# Patient Record
Sex: MALE | Race: WHITE | NOT HISPANIC OR LATINO | Employment: OTHER | URBAN - METROPOLITAN AREA
[De-identification: names, ages, dates, MRNs, and addresses within clinical notes are randomized per-mention and may not be internally consistent; named-entity substitution may affect disease eponyms.]

---

## 2024-07-10 ENCOUNTER — TELEPHONE (OUTPATIENT)
Dept: UROLOGY | Facility: CLINIC | Age: 79
End: 2024-07-10
Payer: OTHER GOVERNMENT

## 2024-07-10 ENCOUNTER — TELEPHONE (OUTPATIENT)
Dept: UROLOGY | Facility: CLINIC | Age: 79
End: 2024-07-10

## 2024-07-12 ENCOUNTER — OFFICE VISIT (OUTPATIENT)
Dept: UROLOGY | Facility: CLINIC | Age: 79
End: 2024-07-12
Payer: OTHER GOVERNMENT

## 2024-07-12 VITALS — WEIGHT: 208.44 LBS | BODY MASS INDEX: 30.87 KG/M2 | HEIGHT: 69 IN

## 2024-07-12 DIAGNOSIS — N21.0 BLADDER STONE: Primary | ICD-10-CM

## 2024-07-12 DIAGNOSIS — R97.20 ELEVATED PSA: ICD-10-CM

## 2024-07-12 DIAGNOSIS — R97.20 ELEVATED PSA: Primary | ICD-10-CM

## 2024-07-12 DIAGNOSIS — Z13.89 SCREENING FOR BLOOD OR PROTEIN IN URINE: ICD-10-CM

## 2024-07-12 LAB
BILIRUBIN, UA POC OHS: NEGATIVE
BLOOD, UA POC OHS: ABNORMAL
CLARITY, UA POC OHS: CLEAR
COLOR, UA POC OHS: YELLOW
GLUCOSE, UA POC OHS: NEGATIVE
KETONES, UA POC OHS: ABNORMAL
LEUKOCYTES, UA POC OHS: NEGATIVE
NITRITE, UA POC OHS: NEGATIVE
PH, UA POC OHS: 5
PROTEIN, UA POC OHS: NEGATIVE
SPECIFIC GRAVITY, UA POC OHS: 1.02
UROBILINOGEN, UA POC OHS: 0.2

## 2024-07-12 PROCEDURE — 99999 PR PBB SHADOW E&M-EST. PATIENT-LVL III: CPT | Mod: PBBFAC,,, | Performed by: UROLOGY

## 2024-07-12 PROCEDURE — 87086 URINE CULTURE/COLONY COUNT: CPT | Performed by: UROLOGY

## 2024-07-12 PROCEDURE — 99213 OFFICE O/P EST LOW 20 MIN: CPT | Mod: PBBFAC,PO | Performed by: UROLOGY

## 2024-07-12 PROCEDURE — 81001 URINALYSIS AUTO W/SCOPE: CPT | Performed by: UROLOGY

## 2024-07-12 RX ORDER — VERAPAMIL HYDROCHLORIDE 240 MG/1
240 TABLET, FILM COATED, EXTENDED RELEASE ORAL
COMMUNITY
Start: 2024-03-13

## 2024-07-12 RX ORDER — TAMSULOSIN HYDROCHLORIDE 0.4 MG/1
0.4 CAPSULE ORAL
COMMUNITY
Start: 2024-04-09

## 2024-07-12 RX ORDER — HYDROCHLOROTHIAZIDE 25 MG/1
12.5 TABLET ORAL
COMMUNITY
Start: 2024-03-13

## 2024-07-12 RX ORDER — LEVOTHYROXINE SODIUM 50 UG/1
0.05 TABLET ORAL
COMMUNITY
Start: 2024-03-13

## 2024-07-12 RX ORDER — POTASSIUM CHLORIDE 20 MEQ/1
20 TABLET, EXTENDED RELEASE ORAL
COMMUNITY
Start: 2024-03-13

## 2024-07-12 RX ORDER — FINASTERIDE 5 MG/1
5 TABLET, FILM COATED ORAL
COMMUNITY
Start: 2024-03-13

## 2024-07-12 RX ORDER — PRAVASTATIN SODIUM 20 MG/1
10 TABLET ORAL
COMMUNITY
Start: 2023-08-21

## 2024-07-12 NOTE — PROGRESS NOTES
Procedure Order to Urology [[id number]]    Electronically signed by: Benton Ventura Jr., MD on 07/12/24 1451 Status: Active   Ordering user: Benton Ventura Jr., MD 07/12/24 1451 Authorized by: Benton Ventura Jr., MD   Ordering mode: Standard   Frequency:  07/12/24 -     Diagnoses  Bladder stone [N21.0]  Screening for blood or protein in urine [Z13.89]   Questionnaire    Question Answer   Procedure Cystoscopy   Pre-op Diagnosis Bladder stone   Facility Name: [facility]   Order comments: Cystoscopy on 7/31/24 at the [facility].   ASC, Please order Urine POCT without micro . Local sedation (no urine Dr Ventura or Dr traylor)

## 2024-07-12 NOTE — PROGRESS NOTES
"[facility] New Patient/H&P:    Andry Tyson is a 78 y.o. male who presents for bladder stone and elevated PSA.    Patient with a history of lower urinary tract symptoms who was referred for a bladder stone incidentally found for work up for bilateral inguinal hernias in 2022. Was never referred to urology.     He has since [facility] with [facility] interval increase in the size of a known bladder calculus measuring 0.73 cm with no bladder wall thickening.     He has mild lower urinary tract symptoms - frequency, urgency and nocturia x 1. Patient has been on Flomax 0.4 mg and Finasteride 5 mg PO daily for several years.     Urine dipstick with moderate blood.     Of note, his PSA is 2.8 (5.6) when adjusted for Finasteride. Has never undergone any evaluation. States his PSA has been elevated for years. No biopsy every performed.     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     Denies any fever, chills, gross hematuria, flank pain, bone pain, unintentional weight loss,  trauma or history of  malignancy.       IPSS QoL  4 2 7/12/24    PSA  2.8*  4/9/24    A1C  5.3  4/9/24    *On Finasteride and outside records      History reviewed. No pertinent past medical history.    Past Surgical History:   Procedure Laterality Date    HERNIA REPAIR         No family history on file.    Review of patient's allergies indicates:   Allergen Reactions    Penicillin Hives       Medications Reviewed: see MAR      FOCUSED PHYSICAL EXAM:    There were no vitals filed for this visit.  Body mass index is 30.78 kg/m². Weight: 94.6 kg (208 lb 7.1 oz) Height: 5' 9" (175.3 cm)       General: Alert, cooperative, no distress, appears stated age  Abdomen: Soft, non-tender, no CVA tenderness, non-distended      LABS:    Recent Results (from the past 336 hour(s))   POCT Urinalysis(Instrument)    Collection Time: 07/12/24  2:20 PM   Result Value Ref Range    Color, POC UA " Yellow Yellow, Straw, Colorless    Clarity, POC UA Clear Clear    Glucose, POC UA Negative Negative    Bilirubin, POC UA Negative Negative    Ketones, POC UA Trace (A) Negative    Spec Grav POC UA 1.025 1.005 - 1.030    Blood, POC UA Moderate (A) Negative    pH, POC UA 5.0 5.0 - 8.0    Protein, POC UA Negative Negative    Urobilinogen, POC UA 0.2 <=1.0    Nitrite, POC UA Negative Negative    WBC, POC UA Negative Negative             Assessment/Diagnosis:    1. Bladder stone  POCT Urinalysis(Instrument)      2. Kidney stones        3. Elevated PSA  MRI Prostate W W/O Contrast    Creatinine, Serum      4. Screening for blood or protein in urine  Urine culture    Urinalysis Microscopic    Cytology, Urine          Plans:    - I spent 45 minutes of the day of this encounter preparing for, treating and managing this patient. The natural history of prostate cancer and ongoing controversy regarding screening and potential treatment outcomes of prostate cancer has been discussed with the patient. The meaning of a false positive PSA and a false negative PSA has been discussed. He indicates understanding of the limitations of this screening test.   - Recommend MRI prostate as his PSA is 5.6 when adjusted for Finasteride and has been high for several years per patient.   - MRI prostate next available.   - UA micro, culture and cytology today.   - Recommend cystoscopy next available for further evaluation of his bladder stone. Scheduled for cystoscopy on 7/31/24.

## 2024-07-13 LAB
BACTERIA #/AREA URNS AUTO: ABNORMAL /HPF
CAOX CRY UR QL COMP ASSIST: ABNORMAL
MICROSCOPIC COMMENT: ABNORMAL
RBC #/AREA URNS AUTO: 6 /HPF (ref 0–4)
WBC #/AREA URNS AUTO: 3 /HPF (ref 0–5)

## 2024-07-23 ENCOUNTER — HOSPITAL ENCOUNTER (OUTPATIENT)
Dept: RADIOLOGY | Facility: HOSPITAL | Age: 79
Discharge: HOME | End: 2024-07-23
Attending: UROLOGY
Payer: OTHER GOVERNMENT

## 2024-07-23 DIAGNOSIS — R97.20 ELEVATED PSA: ICD-10-CM

## 2024-07-23 LAB
CREAT SERPL-MCNC: 1 MG/DL (ref 0.5–1.4)
SAMPLE: NORMAL

## 2024-07-23 PROCEDURE — 72197 MRI PELVIS W/O & W/DYE: CPT | Mod: TC

## 2024-07-23 PROCEDURE — 72197 MRI PELVIS W/O & W/DYE: CPT | Mod: 26,,, | Performed by: RADIOLOGY

## 2024-07-23 PROCEDURE — 25500020 PHARM REV CODE 255

## 2024-07-23 PROCEDURE — A9585 GADOBUTROL INJECTION: HCPCS

## 2024-07-23 RX ORDER — GADOBUTROL 604.72 MG/ML
INJECTION INTRAVENOUS
Status: COMPLETED
Start: 2024-07-23 | End: 2024-07-23

## 2024-07-23 RX ADMIN — GADOBUTROL 9 ML: 604.72 INJECTION INTRAVENOUS at 02:07

## 2024-07-25 ENCOUNTER — PATIENT MESSAGE (OUTPATIENT)
Dept: UROLOGY | Facility: CLINIC | Age: 79
End: 2024-07-25
Payer: OTHER GOVERNMENT

## 2024-07-26 DIAGNOSIS — R97.20 ELEVATED PSA: Primary | ICD-10-CM

## 2024-07-26 RX ORDER — CIPROFLOXACIN 500 MG/1
500 TABLET ORAL 2 TIMES DAILY
Qty: 6 TABLET | Refills: 0 | Status: SHIPPED | OUTPATIENT
Start: 2024-07-26 | End: 2024-07-29

## 2024-07-26 RX ORDER — CIPROFLOXACIN 500 MG/1
500 TABLET ORAL 2 TIMES DAILY
COMMUNITY
End: 2024-07-26 | Stop reason: SDUPTHER

## 2024-07-29 NOTE — TELEPHONE ENCOUNTER
Spoke with pt. Pt stated that his sinuses are acting up. Can he take a Benadryl an a acetaminophen the morning of the procedure. Informed pt that Dr. Ventura is out of the office until 7/30. I will route the message to Dr. Ventura to get his recommendations. Pt verbalized understanding.

## 2024-07-31 ENCOUNTER — HOSPITAL ENCOUNTER (OUTPATIENT)
Facility: HOSPITAL | Age: 79
Discharge: HOME | End: 2024-07-31
Attending: UROLOGY | Admitting: UROLOGY
Payer: OTHER GOVERNMENT

## 2024-07-31 DIAGNOSIS — R97.20 ELEVATED PSA: Primary | ICD-10-CM

## 2024-07-31 PROCEDURE — 55700 PR BIOPSY OF PROSTATE,NEEDLE/PUNCH: CPT | Mod: ,,, | Performed by: UROLOGY

## 2024-07-31 PROCEDURE — 76872 US TRANSRECTAL: CPT | Performed by: UROLOGY

## 2024-07-31 PROCEDURE — 55700 HC PROSTATE NEEDLE BIOPSY: CPT | Performed by: UROLOGY

## 2024-07-31 PROCEDURE — 63600175 PHARM REV CODE 636 W HCPCS: Performed by: UROLOGY

## 2024-07-31 PROCEDURE — 25000003 PHARM REV CODE 250: Performed by: UROLOGY

## 2024-07-31 PROCEDURE — 76872 US TRANSRECTAL: CPT | Mod: 26,,, | Performed by: UROLOGY

## 2024-07-31 PROCEDURE — 52000 CYSTOURETHROSCOPY: CPT | Performed by: UROLOGY

## 2024-07-31 PROCEDURE — 52000 CYSTOURETHROSCOPY: CPT | Mod: 59,,, | Performed by: UROLOGY

## 2024-07-31 RX ORDER — GENTAMICIN 40 MG/ML
80 INJECTION, SOLUTION INTRAMUSCULAR; INTRAVENOUS ONCE
Status: COMPLETED | OUTPATIENT
Start: 2024-07-31 | End: 2024-07-31

## 2024-07-31 RX ORDER — LIDOCAINE HYDROCHLORIDE 20 MG/ML
JELLY TOPICAL
Status: DISCONTINUED | OUTPATIENT
Start: 2024-07-31 | End: 2024-07-31 | Stop reason: HOSPADM

## 2024-07-31 RX ORDER — CIPROFLOXACIN 500 MG/1
500 TABLET ORAL 2 TIMES DAILY
COMMUNITY

## 2024-07-31 RX ORDER — LIDOCAINE HYDROCHLORIDE 20 MG/ML
INJECTION, SOLUTION EPIDURAL; INFILTRATION; INTRACAUDAL; PERINEURAL
Status: DISCONTINUED | OUTPATIENT
Start: 2024-07-31 | End: 2024-07-31 | Stop reason: HOSPADM

## 2024-07-31 RX ADMIN — GENTAMICIN SULFATE 80 MG: 40 INJECTION, SOLUTION INTRAMUSCULAR; INTRAVENOUS at 01:07

## 2024-08-01 ENCOUNTER — CLINICAL SUPPORT (OUTPATIENT)
Dept: UROLOGY | Facility: CLINIC | Age: 79
End: 2024-08-01
Payer: OTHER GOVERNMENT

## 2024-08-01 VITALS
DIASTOLIC BLOOD PRESSURE: 72 MMHG | BODY MASS INDEX: 30.89 KG/M2 | RESPIRATION RATE: 18 BRPM | WEIGHT: 208.56 LBS | HEART RATE: 77 BPM | HEIGHT: 69 IN | TEMPERATURE: 98 F | OXYGEN SATURATION: 96 % | SYSTOLIC BLOOD PRESSURE: 157 MMHG

## 2024-08-01 DIAGNOSIS — R97.20 ELEVATED PSA: Primary | ICD-10-CM

## 2024-08-01 LAB — POC RESIDUAL URINE VOLUME: 0 ML (ref 0–100)

## 2024-08-01 PROCEDURE — 51798 US URINE CAPACITY MEASURE: CPT | Mod: PBBFAC,PO

## 2024-08-01 PROCEDURE — 99999PBSHW POCT BLADDER SCAN: Mod: PBBFAC,,,

## 2024-08-01 PROCEDURE — 99499 UNLISTED E&M SERVICE: CPT | Mod: S$PBB,,, | Performed by: UROLOGY

## 2024-08-01 NOTE — PROGRESS NOTES
Patient arrived to clinic to do PVR and vital check, went to use the bathroom first, bladder scan done, resulted 0 ml.Strict instructions to call us (if during clinic hours) or go to ED (if afterhours) if can't void. Vitals 130/77 and pulse 72. Informed patient to continue to hydrate and will inform  of results

## 2024-08-22 ENCOUNTER — OFFICE VISIT (OUTPATIENT)
Dept: UROLOGY | Facility: CLINIC | Age: 79
End: 2024-08-22
Payer: OTHER GOVERNMENT

## 2024-08-22 VITALS — BODY MASS INDEX: 30.7 KG/M2 | WEIGHT: 207.25 LBS | HEIGHT: 69 IN

## 2024-08-22 DIAGNOSIS — C61 PROSTATE CANCER: Primary | ICD-10-CM

## 2024-08-22 DIAGNOSIS — N21.0 BLADDER STONE: ICD-10-CM

## 2024-08-22 PROCEDURE — G2211 COMPLEX E/M VISIT ADD ON: HCPCS | Mod: S$PBB,,, | Performed by: UROLOGY

## 2024-08-22 PROCEDURE — 99999 PR PBB SHADOW E&M-EST. PATIENT-LVL III: CPT | Mod: PBBFAC,,, | Performed by: UROLOGY

## 2024-08-22 PROCEDURE — 99215 OFFICE O/P EST HI 40 MIN: CPT | Mod: S$PBB,,, | Performed by: UROLOGY

## 2024-08-22 PROCEDURE — 99213 OFFICE O/P EST LOW 20 MIN: CPT | Mod: PBBFAC,PO | Performed by: UROLOGY

## 2024-08-22 NOTE — PROGRESS NOTES
[facility] Established Patient/H&P:    Andry Tyson is a 79 y.o. male who presents for follow up for bladder stone and elevated PSA.     Patient with a history of lower urinary tract symptoms who was referred for a bladder stone incidentally found for work up for bilateral inguinal hernias in 2022. Was never referred to urology.      He has since [facility] CT abdomen [facility] with mild interval increase in the size of a known bladder calculus measuring 0.73 cm with no bladder wall thickening.      He has mild lower urinary tract symptoms - frequency, urgency and nocturia x 1. Patient has been on Flomax 0.4 mg and Finasteride 5 mg PO daily for several years. Moderate ED - not sexually active.      Urine dipstick with moderate blood.      Of note, his PSA is 2.8 (5.6) when adjusted for Finasteride. Has never undergone any evaluation. States his PSA has been elevated for years. No biopsy every performed.      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      Interval History    8/22/24: MRI prostate with two PI-RADs 4 lesions int he right and left apex on 7/23/24. He is now s/p cystoscopy and MRI fusion biopsy on 8/1/24.  Cysto revealed a 7 mm bladder stone with mild bladder trabeculations. Prostate biopsy with [place] 4+4=8 prostate cancer in 2 cores, [place] 4+3=7 in 5 cores and Leti 3+4=7 in 1 core. Here today for follow up.       Denies any fever, chills, gross hematuria, flank pain, bone pain, unintentional weight loss or  trauma.         IPSS QoL  4 2 7/12/24    PVR  0 mL  8/1/24     PSA  2.8*                  4/9/24     A1C  5.3                   4/9/24     *On Finasteride and outside records    Past Medical History:   Diagnosis Date    Elevated PSA     HTN (hypertension)     Hypercholesterolemia     Thyroid disease        Past Surgical History:   Procedure Laterality Date    CYSTOSCOPY N/A 7/31/2024    Procedure: CYSTOSCOPY;  Surgeon: Benton Ventura  "DONNA Dowling MD;  Location: [facility];  Service: Urology;  Laterality: N/A;    HERNIA REPAIR      TRANSRECTAL BIOPSY OF PROSTATE WITH ULTRASOUND GUIDANCE N/A 7/31/2024    Procedure: Uronav  BIOPSY, PROSTATE, RECTAL APPROACH, WITH US GUIDANCE;  Surgeon: Benton Ventura Jr., MD;  Location: [facility] OR;  Service: Urology;  Laterality: N/A;       Review of patient's allergies indicates:   Allergen Reactions    Penicillin Hives       Medications Reviewed: see MAR    FOCUSED PHYSICAL EXAM:    There were no vitals filed for this visit.  Body mass index is 30.6 kg/m². Weight: 94 kg (207 lb 3.7 oz) Height: 5' 9" (175.3 cm)       General: Alert, cooperative, no distress, appears stated age  Abdomen: Soft, non-tender, no CVA tenderness, non-distended        No results found for: "PSA"    LABS:    No results found for this or any previous visit (from the past 336 hour(s)).        Assessment/Diagnosis:    1. Prostate cancer        2. Bladder stone            Plans:    - I spent 40 minutes of the day of this encounter preparing for, treating and managing this patient. Visit today included increased complexity associated with the care of the episodic problem addressed and managing the longitudinal care of the patient due to the serious and/or complex managed problem(s) prostate cancer. Today's visit was spent almost entirely on counseling. We reviewed his diagnosis, stage, grade, risk group, and prognosis. We discussed the concept of low risk, moderate risk, and high risk disease. We discussed the different treatment options including active surveillance (as well as the surveillance protocol), prostate brachytherapy, IMRT, IGRT, cryotherapy, and both open and robotic prostatectomy.We also discussed the advantages, disadvantages, risks and benefits of the different options. Regarding radiation therapy we discussed treatment planning, the different techniques, short and long term complications. These included radiation cystitis, " radiation proctitis, and impotence. We discussed success, failure, and salvage therapeutic options. We discussed surgical therapy in depth including preoperative preparation, surgical technique(including bladder neck and nerve-sparing techniques), postoperative recuperation and recovery, and short and long term complications including UTI, bleeding, blood clots,catheter dislodgement, etc. We discussed the risks of reoperation, incontinence, impotence, and recurrence. We discussed preop and postop Kegels, post op penile rehab, and treatment options for incontinence and impotence. We discussed rates of cancer free survival and recurrence, as well as salvage therapeutic options. We discussed the possible  indications for adjuvant radiation therapy. I answered questions and addressed concerns.  - PSMA PET CT next available.   - Referral to Dr. Love to discuss robotic prostatectomy as patient states he is more interested in surgery. Explained that he will likely need to bladder stone removed prior unless able to be removed at the time of RALP.

## 2024-09-03 ENCOUNTER — HOSPITAL ENCOUNTER (OUTPATIENT)
Dept: RADIOLOGY | Facility: HOSPITAL | Age: 79
Discharge: HOME | End: 2024-09-03
Attending: UROLOGY
Payer: OTHER GOVERNMENT

## 2024-09-03 DIAGNOSIS — C61 PROSTATE CANCER: ICD-10-CM

## 2024-09-03 PROCEDURE — 78815 PET IMAGE W/CT SKULL-THIGH: CPT | Mod: TC,PN

## 2024-09-03 PROCEDURE — 78815 PET IMAGE W/CT SKULL-THIGH: CPT | Mod: 26,PI,, | Performed by: STUDENT IN AN ORGANIZED HEALTH CARE EDUCATION/TRAINING PROGRAM

## 2024-09-03 PROCEDURE — A9595 HC PIFLUFOLASTAT F-18, DX, PER 1 MCI: HCPCS | Mod: TB,PN | Performed by: UROLOGY

## 2024-09-03 RX ADMIN — PIFLUFOLASTAT F-18 9.6 MILLICURIE: 80 INJECTION INTRAVENOUS at 11:09

## 2024-09-03 NOTE — PROGRESS NOTES
PET Imaging Questionnaire    Are you a Diabetic? Recent Blood Sugar level? No    Are you anemic? Bone Marrow Stimulation Meds? No    Have you had a CT Scan, if so when & where was your last one? Yes -     Have you had a PET Scan, if so when & where was your last one? No    Chemotherapy or currently on Chemotherapy? No    Radiation therapy? No    Surgical History:   Past Surgical History:   Procedure Laterality Date    CYSTOSCOPY N/A 7/31/2024    Procedure: CYSTOSCOPY;  Surgeon: Benton Ventura Jr., MD;  Location: [facility];  Service: Urology;  Laterality: N/A;    HERNIA REPAIR      TRANSRECTAL BIOPSY OF PROSTATE WITH ULTRASOUND GUIDANCE N/A 7/31/2024    Procedure: Uronav  BIOPSY, PROSTATE, RECTAL APPROACH, WITH US GUIDANCE;  Surgeon: Benton Ventura Jr., MD;  Location: [facility] OR;  Service: Urology;  Laterality: N/A;        Have you been fasting for at least 6 hours? Yes    Is there any chance you may be pregnant or breastfeeding? No    Assay: 10.3 MCi@:11:43   Injection Site:RT AC    Residual: 0.7 mCi@: 11:47   Technologist: Goyo Lucero Injected:9.6 mCi

## 2024-09-06 NOTE — PROGRESS NOTES
[facility] Clinic Note    PCP: Ruth Sales NP    Chief Complaint: prostate cancer    SUBJECTIVE:       History of Present Illness:  Andry Tyson is a 79 y.o. male who presents to clinic for prostate cancer. He is Established  to our clinic.     PSA is 2.8 (5.6) when adjusted for Finasteride     8/22/24: MRI prostate with two PI-RADs 4 lesions in the right and left apex on 7/23/24. 50 g    Prostate biopsy 8/1/24: Leti 4+4=8 prostate cancer in 2 cores, Leti 4+3=7 in 5 cores and Leti 3+4=7 in 1 core.     PSMA 9/3/24: Focal increased radiotracer uptake in the prostate apex in keeping with known prostate cancer. No evidence of metastatic disease.     He currently on Flomax and Finasteride and has a 1.2 mm bladder stone.   No current bothersome urinary issues.   He does have issues with ED, but is not currently sexually active.     Seeing cardiology on 9/19, no known heart disease.   Hx of bilateral open inguinal hernia repair.     Hx of HLD, HTN    Family  hx: no prostate cancer     Past medical, family, and social history reviewed as documented in chart with pertinent positive medical, family, and social history detailed in HPI.    Review of patient's allergies indicates:   Allergen Reactions    Penicillin Hives       Past Medical History:   Diagnosis Date    Elevated PSA     HTN (hypertension)     Hypercholesterolemia     Thyroid disease      Past Surgical History:   Procedure Laterality Date    CYSTOSCOPY N/A 7/31/2024    Procedure: CYSTOSCOPY;  Surgeon: Benton Ventura Jr., MD;  Location: [facility] OR;  Service: Urology;  Laterality: N/A;    HERNIA REPAIR      TRANSRECTAL BIOPSY OF PROSTATE WITH ULTRASOUND GUIDANCE N/A 7/31/2024    Procedure: Uronav  BIOPSY, PROSTATE, RECTAL APPROACH, WITH US GUIDANCE;  Surgeon: Benton Ventura Jr., MD;  [facility];  Service: Urology;  Laterality: N/A;     No family history on file.  Social History     Tobacco Use    Smoking status:  "Never    Smokeless tobacco: Never        Review of Systems    OBJECTIVE:     Anticoagulation:  no    Estimated body mass index is 30.6 kg/m² as calculated from the following:    Height as of 8/22/24: 5' 9" (1.753 m).    Weight as of 8/22/24: 94 kg (207 lb 3.7 oz).    Vital Signs (Most Recent)       Physical Exam  Constitutional:       General: He is not in acute distress.     Appearance: Normal appearance. He is not ill-appearing.   HENT:      Head: Normocephalic and atraumatic.   Eyes:      General: No scleral icterus.  Pulmonary:      Effort: Pulmonary effort is normal. No respiratory distress.   Skin:     Coloration: Skin is not jaundiced.   Neurological:      General: No focal deficit present.      Mental Status: He is alert and oriented to person, place, and time.   Psychiatric:         Mood and Affect: Mood normal.         Behavior: Behavior normal.         Thought Content: Thought content normal.       Imaging:  Per HPI    ASSESSMENT     1. Prostate cancer      PLAN:     Today's visit was spent almost entirely on counseling. We reviewed his diagnosis, stage, grade, risk group, and prognosis. We discussed [place] (NCCN) and CARLA risk stratification  We discussed the concept of low risk, intermediate risk, and high risk disease. We also reviewed the NCCN treatment nomogram.We discussed the different treatment options including active surveillance (as well as the surveillance protocol), prostate radiation and robotic prostatectomy.We also discussed the advantages, disadvantages, risks and benefits, as well as complications of each option. Regarding radiation therapy we discussed potential worsening of his LUTS. We discussed presence of his bladder stone indicates that he likely has some level of obstruction from his prostate already and radiation may worsen these symptoms.     We discussed surgical therapy in depth including preoperative preparation, surgical technique (including bladder neck and nerve-sparing " techniques), postoperative recuperation and recovery, and short and long term complications including UTI, anastomotic leak, bleeding, blood clots,catheter dislodgement, etc. We discussed the risks of reoperation, incontinence, impotence, and recurrence. We discussed preop and postop Kegels, post op penile rehab, and treatment options for incontinence and impotence. We discussed rates of cancer free survival and recurrence, as well as salvage therapeutic options. We discussed the possible  indications for adjuvant radiation therapy. Specifically regarding his age we discussed increased risk of incontinence post op which may not completely resolve. We also discussed possibility given his hernia repairs that a LN dissection may be more difficult or impossible.     He wants to meet with Rad Onc and explore all options.   He also wants to wait till after his cardiology apt to make a final decision.     He will reach out to me or Dr. Ventura regarding his decision in the coming weeks.   I answered questions and addressed concerns and encouraged him to message me with any further questions.       Meenakshi Love MD       Visit today included increased complexity associated with the care of the episodic problem prostate cancer addressed and managing the longitudinal care of the patient due to the serious and/or complex managed problem(s).

## 2024-09-09 ENCOUNTER — OFFICE VISIT (OUTPATIENT)
Dept: UROLOGY | Facility: CLINIC | Age: 79
End: 2024-09-09
Payer: OTHER GOVERNMENT

## 2024-09-09 DIAGNOSIS — C61 PROSTATE CANCER: Primary | ICD-10-CM

## 2024-09-09 PROCEDURE — 99215 OFFICE O/P EST HI 40 MIN: CPT | Mod: S$PBB,,, | Performed by: STUDENT IN AN ORGANIZED HEALTH CARE EDUCATION/TRAINING PROGRAM

## 2024-09-09 PROCEDURE — G2211 COMPLEX E/M VISIT ADD ON: HCPCS | Mod: S$PBB,,, | Performed by: STUDENT IN AN ORGANIZED HEALTH CARE EDUCATION/TRAINING PROGRAM

## 2024-09-09 PROCEDURE — 99999 PR PBB SHADOW E&M-EST. PATIENT-LVL II: CPT | Mod: PBBFAC,,, | Performed by: STUDENT IN AN ORGANIZED HEALTH CARE EDUCATION/TRAINING PROGRAM

## 2024-09-09 PROCEDURE — 99212 OFFICE O/P EST SF 10 MIN: CPT | Mod: PBBFAC,PO | Performed by: STUDENT IN AN ORGANIZED HEALTH CARE EDUCATION/TRAINING PROGRAM

## 2024-09-16 ENCOUNTER — TELEPHONE (OUTPATIENT)
Dept: RADIATION ONCOLOGY | Facility: CLINIC | Age: 79
End: 2024-09-16

## 2024-09-18 ENCOUNTER — TELEPHONE (OUTPATIENT)
Dept: UROLOGY | Facility: CLINIC | Age: 79
End: 2024-09-18
Payer: OTHER GOVERNMENT

## 2024-09-18 NOTE — TELEPHONE ENCOUNTER
----- Message from Keila Putnam, RT sent at 9/16/2024 10:51 AM CDT -----  Regarding: Need [facility] Author[facility] for [facility]On Consultation  Thank you for referring Mr. Tyson to our clinic.  I see he has [facility] and we will need a referral for his radiation oncology consultation.  If you have not done so please [facility] RFS [facility] the [facility].  We will schedule the patient's appointment as soon as we receive the referral. I have notified the patient. Thank you.  Keila Putnam

## 2024-09-18 NOTE — TELEPHONE ENCOUNTER
----- Message from Marielena Estrada sent at 9/18/2024  2:41 PM CDT -----  Type:  Needs Medical Advice    Who Called:  Brook from [place] [facility]    Would the patient rather a call back or a response via [facility]?  Call back    Best Call Back Number:  [phone]  Fax- [phone]    Additional Information:  Needing clinic notes to go with order.   Please call back to advise. Thanks!

## 2024-09-19 ENCOUNTER — PATIENT MESSAGE (OUTPATIENT)
Dept: UROLOGY | Facility: CLINIC | Age: 79
End: 2024-09-19
Payer: OTHER GOVERNMENT

## 2024-09-23 ENCOUNTER — TELEPHONE (OUTPATIENT)
Dept: UROLOGY | Facility: CLINIC | Age: 79
End: 2024-09-23
Payer: OTHER GOVERNMENT

## 2024-09-23 NOTE — TELEPHONE ENCOUNTER
----- Message from Tram Ahn sent at 9/23/2024  9:46 AM CDT -----  Regarding: Needs Medical Notes  Contact: [facility] at [phone]  Type: Needs Medical Notes    Who Called:  SAM Doe [facility] at [phone] fax #[phone]    Additional Information: Needs medical notes to go with referral order to radiation oncology. Please call and advise. Thank you

## 2024-09-23 NOTE — TELEPHONE ENCOUNTER
Returned call informed the notes was faxed last week, she stated she needs the notes to be with the RFS form. Form retrieved and faxed with notes to fax number given, she verbally understood.

## 2024-09-26 ENCOUNTER — DOCUMENTATION ONLY (OUTPATIENT)
Dept: RADIATION ONCOLOGY | Facility: CLINIC | Age: 79
End: 2024-09-26

## 2024-09-26 ENCOUNTER — OFFICE VISIT (OUTPATIENT)
Dept: RADIATION ONCOLOGY | Facility: CLINIC | Age: 79
End: 2024-09-26
Payer: OTHER GOVERNMENT

## 2024-09-26 VITALS
RESPIRATION RATE: 16 BRPM | SYSTOLIC BLOOD PRESSURE: 110 MMHG | HEART RATE: 64 BPM | OXYGEN SATURATION: 95 % | DIASTOLIC BLOOD PRESSURE: 72 MMHG | BODY MASS INDEX: 30.29 KG/M2 | WEIGHT: 205.13 LBS

## 2024-09-26 DIAGNOSIS — C61 PROSTATE CANCER: ICD-10-CM

## 2024-09-26 DIAGNOSIS — C61 PROSTATE CANCER: Primary | ICD-10-CM

## 2024-09-26 RX ORDER — ASCORBIC ACID 500 MG
500 TABLET ORAL DAILY
COMMUNITY

## 2024-09-26 RX ORDER — ASPIRIN 81 MG/1
81 TABLET ORAL DAILY
COMMUNITY

## 2024-09-26 NOTE — PROGRESS NOTES
Pt here for consult  Pt undecided about getting treatment here or in [place]  Pt will decide   Pt will need lupron from dr dunlap now

## 2024-09-26 NOTE — PROGRESS NOTES
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 Tyson is a 79M who p/w a rising PSA and now the following w/u & onc hx:    PSA is 2.8 (5.6) when adjusted for Finasteride      8/22/24: MRI prostate with two PI-RADs 4 lesions in the right and left apex on 7/23/24. 50 g     Prostate biopsy 8/1/24: [place] 4+4=8 prostate cancer in 2 cores, [place] 4+3=7 in 5 cores and [place] 3+4=7 in 1 core.      PSMA 9/3/24: Focal increased radiotracer uptake in the prostate apex in keeping with known prostate cancer. No evidence of metastatic disease.     IPSS:  6/35   Mild dysuria  QoL:   2/6 Mostly satisfied  Teena:  1/25 Severe ED      REVIEW OF SYSTEMS:  A complete ROS was performed and the patient denies any acute changes/concerns other than per HPI.    Past Medical History:   Diagnosis Date    Elevated PSA     HTN (hypertension)     Hypercholesterolemia     Thyroid disease      Past Surgical History:   Procedure Laterality Date    CYSTOSCOPY N/A 7/31/2024    Procedure: CYSTOSCOPY;  Surgeon: Benton Ventura Jr., MD;  Location: [facility] OR;  Service: Urology;  Laterality: N/A;    HERNIA REPAIR      TRANSRECTAL BIOPSY OF PROSTATE WITH ULTRASOUND GUIDANCE N/A 7/31/2024    Procedure: [place]  BIOPSY, PROSTATE, RECTAL APPROACH, WITH US GUIDANCE;  Surgeon: Benton Ventura Jr., MD;  Location: [facility] OR;  Service: Urology;  Laterality: N/A;     Social History     Socioeconomic History    Marital status: Single   Tobacco Use    Smoking status: Never    Smokeless tobacco: Never     Social Determinants of Health     Financial Resource Strain: Low Risk  (8/16/2024)    Overall Financial Resource Strain (CARDIA)     Difficulty of Paying Living Expenses: Not hard at all   Food Insecurity: No Food Insecurity (8/16/2024)    Hunger Vital Sign     Worried About  Running Out of Food in the Last Year: Never true     Ran Out of Food in the Last Year: Never true   Physical Activity: Unknown (8/16/2024)    Exercise Vital Sign     Days of Exercise per Week: 2 days     Minutes of Exercise per Session: Patient declined   Stress: No Stress Concern Present (8/16/2024)    [employer] - Occupational Stress Questionnaire     Feeling of Stress : Only a little   Housing Stability: Unknown (8/16/2024)    Housing Stability Vital Sign     Unable to Pay for Housing in the Last Year: No     No family history on file.    PRIOR HISTORY OF CHEMOTHERAPY OR RADIOTHERAPY: Please see HPI for patients prior oncologic history.    Medication List with Changes/Refills   Current Medications    FINASTERIDE (PROSCAR) 5 MG TABLET    Take 5 mg by mouth.    HYDROCHLOROTHIAZIDE (HYDRODIURIL) 25 MG TABLET    Take 12.5 mg by mouth.    LEVOTHYROXINE (SYNTHROID) 50 MCG TABLET    Take 0.05 mg by mouth.    POTASSIUM CHLORIDE SA (K-DUR,KLOR-CON) 20 MEQ TABLET    Take 20 mEq by mouth.    PRAVASTATIN (PRAVACHOL) 20 MG TABLET    Take 10 mg by mouth.    TAMSULOSIN (FLOMAX) 0.4 MG CAP    Take 0.4 mg by mouth.    VERAPAMIL (CALAN-SR) 240 MG CR TABLET    Take 240 mg by mouth.     Review of patient's allergies indicates:   Allergen Reactions    Penicillin Hives       QUALITY OF LIFE: 90%- Able to Carry on Normal Activity: Minor Symptoms of Disease    There were no vitals filed for this visit.  There is no height or weight on file to calculate BMI.    PHYSICAL EXAM:  GENERAL: alert; in no apparent distress.   HEAD: normocephalic, atraumatic.  EYES: pupils are equal, round, reactive to light and accommodation. Sclera anicteric. Conjunctiva not injected.   NOSE/THROAT: no nasal erythema or rhinorrhea. Oropharynx pink, without erythema, ulcerations or thrush.   NECK: no cervical motion rigidity; supple with no masses.  CHEST: clear to auscultation bilaterally; no wheezes, crackles or rubs. Patient is  speaking comfortably on room air with normal work of breathing without using accessory muscles of respiration.  CARDIOVASCULAR: regular rate and rhythm; no murmurs, rubs or gallops.  ABDOMEN: soft, nontender, nondistended. Bowel sounds present.   MUSCULOSKELETAL: no tenderness to palpation along the spine or scapulae. Normal range of motion.  NEUROLOGIC: cranial nerves II-XII intact bilaterally. Strength 5/5 in bilateral upper and lower extremities. No sensory deficits appreciated. Reflexes globally intact. No cerebellar signs. Normal gait.  LYMPHATIC: no cervical, supraclavicular or axillary adenopathy appreciated bilaterally.   EXTREMITIES: no clubbing, cyanosis, edema.  SKIN: no erythema, rashes, ulcerations noted.     REVIEW OF IMAGING/PATHOLOGY/LABS: Please see HPI. All images reviewed personally by dictating physician.       ASSESSMENT: Andry Tyson is a 79 y.o. male with high risk prostate adenoCA  [[id number] - Grp4 - PSA: 2.8 (5.6 - finasteride)]    PLAN:  After complete review of the patient's chart/hx and eval/discussion w/ the patient today, I spent over one hour in consultation with the patient discussing his current treatment options for his high risk prostate cancer, which include radical prostatectomy w/ pelvic lymph node dissection and definitive radiation therapy w/ concurrent/adj ADT (x2-3yrs).     We reviewed and discussed these options, with regard to his age, life expectancy, and comorbidities, along with the rationales, risks, benefits, alternatives, and expected outcomes. I also explained the differing details in each of these standard therapys toxicity risks. If deemed medically operable and safe for anesthesia, then either of these options are appropriate for this patient  It was also highlighted that RT may possibly be indicated after RP, and the possible reasons for this, such as (+)SM/EVY/SVI,  were discussed.     We then further discussed RT via IMRT and/or brachytherapy.  I reviewed  in detail the indications, differences, and potential toxicities, including but not limited to fatigue, skin irritation/erythema/desquamation, pain, worsening of dysuria, diarrhea, irritation of hemorrhoids, acute/chronic proctitis leading to rectal bleeding/discomfort and possible need for procedural intervention, cystitis with potential for perforation/fistulization requiring procedure intervention, erectile dysfunction, sterility, late urethral narrowing and/or stricture causing urinary retention and requiring procedural dilation or catheterization, increased risk of hip fracture, and secondary malignancy.  I carefully explained the process of simulation and treatment delivery with weekly physician visits.     It was also discussed that, for optimal delivery of IMRT, fiducial markers would need to be placed in his prostate gland for IGRT prior to initiating RT, which could also be accompanied by implantation of rectal spacing gel for distancing of his rectum further from the gland and high-dose radiation region.     The patient verbalized understanding of the above plan and risks, and questions were answered fully and appropriately.  Ultimately it was decided that the patient would most prefer to undergo definitive IMRT with concurrent/adj ADT.  He would also like to proceed with fiducial and rectal spacing gel placement.      He will now return to Urology for initiation of ADT as well as gel & fiducial marker placement, to be followed by RT planning.  RT dose/regimen will be determined in planning.    Patient may consider getting RT done at nearer his home/work at [facility], which [facility], however he is undecided on this now and declined offered referral today.    DISPOSITION: RTC FOR CT SIM    I have personally seen and evaluated this patient. Greater than 50% of this time was spent discussing coordination of care and/or counseling.    PHYSICIAN: Jonas Rainey III,  MD    Thank you for the opportunity to meet and consult with Andry Tyson.   Please feel free to contact me to discuss the above recommendation further.

## 2024-10-03 ENCOUNTER — OFFICE VISIT (OUTPATIENT)
Dept: UROLOGY | Facility: CLINIC | Age: 79
End: 2024-10-03
Payer: OTHER GOVERNMENT

## 2024-10-03 DIAGNOSIS — C61 PROSTATE CANCER: Primary | ICD-10-CM

## 2024-10-03 DIAGNOSIS — N21.0 BLADDER STONE: ICD-10-CM

## 2024-10-03 PROCEDURE — 99214 OFFICE O/P EST MOD 30 MIN: CPT | Mod: 95,,, | Performed by: UROLOGY

## 2024-10-03 PROCEDURE — G2211 COMPLEX E/M VISIT ADD ON: HCPCS | Mod: 95,,, | Performed by: UROLOGY

## 2024-10-03 RX ORDER — CIPROFLOXACIN 500 MG/1
500 TABLET ORAL 2 TIMES DAILY
Qty: 6 TABLET | Refills: 0 | Status: SHIPPED | OUTPATIENT
Start: 2024-10-03 | End: 2024-10-06

## 2024-10-03 NOTE — PROGRESS NOTES
Established Patient - Audio Only Telehealth Visit     The patient location is: Home  The chief complaint leading to consultation is: Prostate cancer  Visit type: Virtual visit with audio only (telephone)  Total time spent with patient: 22 minutes       The reason for the audio only service rather than synchronous audio and video virtual visit was related to technical difficulties or patient preference/necessity.     Each patient to whom I provide medical services by telemedicine is:  (1) informed of the relationship between the physician and patient and the respective role of any other health care provider with respect to management of the patient; and (2) notified that they may decline to receive medical services by telemedicine and may withdraw from such care at any time. Patient verbally consented to receive this service via voice-only telephone call.       HPI:     Andry Tyson is a 79 y.o. male who presents for follow up for bladder stone and elevated PSA.     Patient with a history of lower urinary tract symptoms who was referred for a bladder stone incidentally found for work up for bilateral inguinal hernias in 2022. Was never referred to urology.      He has since [facility] with mild interval increase in the size of a known bladder calculus measuring 0.73 cm with no bladder wall thickening.      He has mild lower urinary tract symptoms - frequency, urgency and nocturia x 1. Patient has been on Flomax 0.4 mg and Finasteride 5 mg PO daily for several years. Moderate ED - not sexually active.      Urine dipstick with moderate blood.      Of note, his PSA is 2.8 (5.6) when adjusted for Finasteride. Has never undergone any evaluation. States his PSA has been elevated for years. No biopsy every performed.      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        Interval History     8/22/24: MRI prostate with two PI-RADs 4 lesions int he right and left  apex on 7/23/24. He is now s/p cystoscopy and MRI fusion biopsy on 8/1/24.  Cysto revealed a 7 mm bladder stone with mild bladder trabeculations. Prostate biopsy with [place] 4+4=8 prostate cancer in 2 cores, [place] 4+3=7 in 5 cores and [place] 3+4=7 in 1 core. Here today for follow up.     10/3/24: Virtual audio visit today for follow up. He has undergone evaluation with Dr. Love to discuss RALP and rad onc. He states he is interested in proceeding with radiation therapy. Needs Lupron, spaceOAR and markers. Remains with a bladder stone.       Denies any fever, chills, gross hematuria, flank pain, bone pain, unintentional weight loss or  trauma.         IPSS  QoL  4 2          7/12/24     PVR  0 mL                8/1/24     PSA  2.8*                  4/9/24     A1C  5.3                   4/9/24     *On Finasteride and outside records       Assessment and plan:         Prostate cancer  Bladder stone    Visit today included increased complexity associated with the care of the episodic problem addressed and managing the longitudinal care of the patient due to the serious and/or complex managed problem(s) prostate cancer and bladder stone. Today's visit was spent almost entirely on counseling. We reviewed his diagnosis, stage, grade, risk group, and prognosis. We discussed the concept of low risk, moderate risk, and high risk disease. We discussed the different treatment options including active surveillance (as well as the surveillance protocol), prostate brachytherapy, IMRT, IGRT, cryotherapy, and both open and robotic prostatectomy.We also discussed the advantages, disadvantages, risks and benefits of the different options. Regarding radiation therapy we discussed treatment planning, the different techniques, short and long term complications. These included radiation cystitis, radiation proctitis, and impotence. We discussed success, failure, and salvage therapeutic options. We discussed surgical therapy in  depth including preoperative preparation, surgical technique(including bladder neck and nerve-sparing techniques), postoperative recuperation and recovery, and short and long term complications including UTI, bleeding, blood clots,catheter dislodgement, etc. We discussed the risks of reoperation, incontinence, impotence, and recurrence. We discussed preop and postop Kegels, post op penile rehab, and treatment options for incontinence and impotence. We discussed rates of cancer free survival and recurrence, as well as salvage therapeutic options. We discussed the possible  indications for adjuvant radiation therapy. I answered questions and addressed concerns.  - Wishes to proceed with XRT.   - Lupron next available with nurse practitioner.   - SpaceOAR, markers and bladder stone removal on 10/15/24.        This service was not originating from a related E/M service provided within the previous 7 days nor will  to an E/M service or procedure within the next 24 hours or my soonest available appointment.  Prevailing standard of care was able to be met in this audio-only visit.

## 2024-10-03 NOTE — PROGRESS NOTES
Procedure Order to Urology [[id number]]    Electronically signed by: Benton Ventura Jr., MD on 10/03/24 1408 Status: Active   Ordering user: Benton Ventura Jr., MD 10/03/24 1408 Authorized by: Benton Ventura Jr., MD   Ordering mode: Standard   Frequency:  10/03/24 -     Diagnoses  Prostate cancer [C61]   Questionnaire    Question Answer   Procedure Prostate SpaceOAR and Fiducial Markers  Cystolithotripsy/Cystolithalopaxy   Pre-op Diagnosis Prostate cancer  Bladder stone   Facility Name: [facility]   Order comments: SpaceOAR, markers and bladder stone removal on 10/15/24 at [facility].   OR , General sedation 80mg IV Gentamycin and 160mg IV Gentamycin if over 300 lbs , Rocephin 2 gram IV , start IV. NPO order BMP, PT INR UA and culture and EKG. 77715, [phone]  Please order out patient hospital.CBC, BMP, U/A and culture , EKG over 40, Start IV,NPO, General sedation ,  Ancef 2gram ( alternative for pcn allergy cipro 400mg IV ) cpt [id number] and [id number]

## 2024-10-04 ENCOUNTER — PATIENT MESSAGE (OUTPATIENT)
Dept: UROLOGY | Facility: CLINIC | Age: 79
End: 2024-10-04
Payer: OTHER GOVERNMENT

## 2024-10-04 RX ORDER — CIPROFLOXACIN 2 MG/ML
400 INJECTION, SOLUTION INTRAVENOUS
OUTPATIENT
Start: 2024-10-04

## 2024-10-04 RX ORDER — GENTAMICIN SULFATE 80 MG/100ML
80 INJECTION, SOLUTION INTRAVENOUS
OUTPATIENT
Start: 2024-10-04

## 2024-10-06 ENCOUNTER — PATIENT MESSAGE (OUTPATIENT)
Dept: RADIATION ONCOLOGY | Facility: CLINIC | Age: 79
End: 2024-10-06

## 2024-10-08 ENCOUNTER — LAB VISIT (OUTPATIENT)
Dept: LAB | Facility: HOSPITAL | Age: 79
End: 2024-10-08
Attending: UROLOGY
Payer: OTHER GOVERNMENT

## 2024-10-08 DIAGNOSIS — C61 PROSTATE CANCER: ICD-10-CM

## 2024-10-08 LAB
ANION GAP SERPL CALC-SCNC: 12 MMOL/L (ref 8–16)
BASOPHILS # BLD AUTO: 0.02 K/UL (ref 0–0.2)
BASOPHILS NFR BLD: 0.3 % (ref 0–1.9)
BUN SERPL-MCNC: 17 MG/DL (ref 8–23)
CALCIUM SERPL-MCNC: 9 MG/DL (ref 8.7–10.5)
CHLORIDE SERPL-SCNC: 106 MMOL/L (ref 95–110)
CO2 SERPL-SCNC: 20 MMOL/L (ref 23–29)
CREAT SERPL-MCNC: 0.8 MG/DL (ref 0.5–1.4)
DIFFERENTIAL METHOD BLD: NORMAL
EOSINOPHIL # BLD AUTO: 0.1 K/UL (ref 0–0.5)
EOSINOPHIL NFR BLD: 2.1 % (ref 0–8)
ERYTHROCYTE [DISTWIDTH] IN BLOOD BY AUTOMATED COUNT: 13.5 % (ref 11.5–14.5)
EST. GFR  (NO RACE VARIABLE): >60 ML/MIN/1.73 M^2
GLUCOSE SERPL-MCNC: 89 MG/DL (ref 70–110)
HCT VFR BLD AUTO: 46.3 % (ref 40–54)
HGB BLD-MCNC: 15.5 G/DL (ref 14–18)
IMM GRANULOCYTES # BLD AUTO: 0.01 K/UL (ref 0–0.04)
IMM GRANULOCYTES NFR BLD AUTO: 0.2 % (ref 0–0.5)
LYMPHOCYTES # BLD AUTO: 1.2 K/UL (ref 1–4.8)
LYMPHOCYTES NFR BLD: 21 % (ref 18–48)
MCH RBC QN AUTO: 28.3 PG (ref 27–31)
MCHC RBC AUTO-ENTMCNC: 33.5 G/DL (ref 32–36)
MCV RBC AUTO: 85 FL (ref 82–98)
MONOCYTES # BLD AUTO: 0.6 K/UL (ref 0.3–1)
MONOCYTES NFR BLD: 10 % (ref 4–15)
NEUTROPHILS # BLD AUTO: 3.9 K/UL (ref 1.8–7.7)
NEUTROPHILS NFR BLD: 66.4 % (ref 38–73)
NRBC BLD-RTO: 0 /100 WBC
PLATELET # BLD AUTO: 185 K/UL (ref 150–450)
PMV BLD AUTO: 10.4 FL (ref 9.2–12.9)
POTASSIUM SERPL-SCNC: 3.7 MMOL/L (ref 3.5–5.1)
RBC # BLD AUTO: 5.48 M/UL (ref 4.6–6.2)
SODIUM SERPL-SCNC: 138 MMOL/L (ref 136–145)
WBC # BLD AUTO: 5.81 K/UL (ref 3.9–12.7)

## 2024-10-08 PROCEDURE — 85025 COMPLETE CBC W/AUTO DIFF WBC: CPT | Performed by: UROLOGY

## 2024-10-08 PROCEDURE — 36415 COLL VENOUS BLD VENIPUNCTURE: CPT | Performed by: UROLOGY

## 2024-10-08 PROCEDURE — 80048 BASIC METABOLIC PNL TOTAL CA: CPT | Performed by: UROLOGY

## 2024-10-11 RX ORDER — ROSUVASTATIN CALCIUM 20 MG/1
20 TABLET, COATED ORAL DAILY
COMMUNITY

## 2024-10-14 ENCOUNTER — ANESTHESIA EVENT (OUTPATIENT)
Dept: SURGERY | Facility: HOSPITAL | Age: 79
End: 2024-10-14
Payer: OTHER GOVERNMENT

## 2024-10-15 ENCOUNTER — HOSPITAL ENCOUNTER (OUTPATIENT)
Facility: HOSPITAL | Age: 79
Discharge: HOME | End: 2024-10-15
Attending: UROLOGY | Admitting: UROLOGY
Payer: OTHER GOVERNMENT

## 2024-10-15 ENCOUNTER — ANESTHESIA (OUTPATIENT)
Dept: SURGERY | Facility: HOSPITAL | Age: 79
End: 2024-10-15
Payer: OTHER GOVERNMENT

## 2024-10-15 DIAGNOSIS — C61 PROSTATE CANCER: Primary | ICD-10-CM

## 2024-10-15 DIAGNOSIS — Z01.810 PREOP CARDIOVASCULAR EXAM: ICD-10-CM

## 2024-10-15 DIAGNOSIS — N21.0 BLADDER STONE: ICD-10-CM

## 2024-10-15 PROCEDURE — 27200651 HC AIRWAY, LMA: Performed by: ANESTHESIOLOGY

## 2024-10-15 PROCEDURE — 71000033 HC RECOVERY, INTIAL HOUR: Performed by: UROLOGY

## 2024-10-15 PROCEDURE — C1769 GUIDE WIRE: HCPCS | Performed by: UROLOGY

## 2024-10-15 PROCEDURE — C1889 IMPLANT/INSERT DEVICE, NOC: HCPCS | Performed by: UROLOGY

## 2024-10-15 PROCEDURE — 25000003 PHARM REV CODE 250: Performed by: UROLOGY

## 2024-10-15 PROCEDURE — 55874 TPRNL PLMT BIODEGRDABL MATRL: CPT | Mod: 51,,, | Performed by: UROLOGY

## 2024-10-15 PROCEDURE — 82365 CALCULUS SPECTROSCOPY: CPT | Performed by: UROLOGY

## 2024-10-15 PROCEDURE — 52317 REMOVE BLADDER STONE: CPT | Mod: ,,, | Performed by: UROLOGY

## 2024-10-15 PROCEDURE — 63600175 PHARM REV CODE 636 W HCPCS: Performed by: UROLOGY

## 2024-10-15 PROCEDURE — 71000016 HC POSTOP RECOV ADDL HR: Performed by: UROLOGY

## 2024-10-15 PROCEDURE — 63600175 PHARM REV CODE 636 W HCPCS: Performed by: NURSE ANESTHETIST, CERTIFIED REGISTERED

## 2024-10-15 PROCEDURE — 71000039 HC RECOVERY, EACH ADD'L HOUR: Performed by: UROLOGY

## 2024-10-15 PROCEDURE — 76872 US TRANSRECTAL: CPT | Mod: 26,59,, | Performed by: UROLOGY

## 2024-10-15 PROCEDURE — 71000015 HC POSTOP RECOV 1ST HR: Performed by: UROLOGY

## 2024-10-15 PROCEDURE — 37000008 HC ANESTHESIA 1ST 15 MINUTES: Performed by: UROLOGY

## 2024-10-15 PROCEDURE — 36000707: Performed by: UROLOGY

## 2024-10-15 PROCEDURE — 93010 ELECTROCARDIOGRAM REPORT: CPT | Mod: ,,, | Performed by: INTERNAL MEDICINE

## 2024-10-15 PROCEDURE — 55876 PLACE RT DEVICE/MARKER PROS: CPT | Mod: 51,,, | Performed by: UROLOGY

## 2024-10-15 PROCEDURE — 93005 ELECTROCARDIOGRAM TRACING: CPT

## 2024-10-15 PROCEDURE — 25000003 PHARM REV CODE 250: Performed by: NURSE ANESTHETIST, CERTIFIED REGISTERED

## 2024-10-15 PROCEDURE — 37000009 HC ANESTHESIA EA ADD 15 MINS: Performed by: UROLOGY

## 2024-10-15 PROCEDURE — 36000706: Performed by: UROLOGY

## 2024-10-15 PROCEDURE — A4648 IMPLANTABLE TISSUE MARKER: HCPCS | Performed by: UROLOGY

## 2024-10-15 PROCEDURE — C1758 CATHETER, URETERAL: HCPCS | Performed by: UROLOGY

## 2024-10-15 DEVICE — MARKERS GOLD SOFT TISSUE: Type: IMPLANTABLE DEVICE | Site: PROSTATE | Status: FUNCTIONAL

## 2024-10-15 RX ORDER — EPHEDRINE SULFATE 50 MG/ML
INJECTION, SOLUTION INTRAVENOUS
Status: DISCONTINUED | OUTPATIENT
Start: 2024-10-15 | End: 2024-10-15

## 2024-10-15 RX ORDER — GENTAMICIN SULFATE 80 MG/100ML
80 INJECTION, SOLUTION INTRAVENOUS
Status: COMPLETED | OUTPATIENT
Start: 2024-10-15 | End: 2024-10-15

## 2024-10-15 RX ORDER — FENTANYL CITRATE 50 UG/ML
INJECTION, SOLUTION INTRAMUSCULAR; INTRAVENOUS
Status: DISCONTINUED | OUTPATIENT
Start: 2024-10-15 | End: 2024-10-15

## 2024-10-15 RX ORDER — LIDOCAINE HYDROCHLORIDE 10 MG/ML
1 INJECTION, SOLUTION EPIDURAL; INFILTRATION; INTRACAUDAL; PERINEURAL ONCE
Status: DISCONTINUED | OUTPATIENT
Start: 2024-10-15 | End: 2024-10-15 | Stop reason: HOSPADM

## 2024-10-15 RX ORDER — OXYCODONE HYDROCHLORIDE 5 MG/1
5 TABLET ORAL ONCE AS NEEDED
Status: DISCONTINUED | OUTPATIENT
Start: 2024-10-15 | End: 2024-10-15 | Stop reason: HOSPADM

## 2024-10-15 RX ORDER — FENTANYL CITRATE 50 UG/ML
25 INJECTION, SOLUTION INTRAMUSCULAR; INTRAVENOUS EVERY 5 MIN PRN
Status: DISCONTINUED | OUTPATIENT
Start: 2024-10-15 | End: 2024-10-15 | Stop reason: HOSPADM

## 2024-10-15 RX ORDER — ONDANSETRON HYDROCHLORIDE 2 MG/ML
INJECTION, SOLUTION INTRAMUSCULAR; INTRAVENOUS
Status: DISCONTINUED | OUTPATIENT
Start: 2024-10-15 | End: 2024-10-15

## 2024-10-15 RX ORDER — PHENAZOPYRIDINE HYDROCHLORIDE 200 MG/1
200 TABLET, FILM COATED ORAL ONCE
Status: COMPLETED | OUTPATIENT
Start: 2024-10-15 | End: 2024-10-15

## 2024-10-15 RX ORDER — ONDANSETRON HYDROCHLORIDE 2 MG/ML
4 INJECTION, SOLUTION INTRAVENOUS ONCE AS NEEDED
Status: DISCONTINUED | OUTPATIENT
Start: 2024-10-15 | End: 2024-10-15 | Stop reason: HOSPADM

## 2024-10-15 RX ORDER — CIPROFLOXACIN 2 MG/ML
400 INJECTION, SOLUTION INTRAVENOUS
Status: COMPLETED | OUTPATIENT
Start: 2024-10-15 | End: 2024-10-15

## 2024-10-15 RX ORDER — LIDOCAINE HYDROCHLORIDE 20 MG/ML
INJECTION INTRAVENOUS
Status: DISCONTINUED | OUTPATIENT
Start: 2024-10-15 | End: 2024-10-15

## 2024-10-15 RX ORDER — PHENAZOPYRIDINE HYDROCHLORIDE 200 MG/1
200 TABLET, FILM COATED ORAL
Qty: 15 TABLET | Refills: 0 | Status: SHIPPED | OUTPATIENT
Start: 2024-10-15 | End: 2024-10-20

## 2024-10-15 RX ORDER — PROPOFOL 10 MG/ML
VIAL (ML) INTRAVENOUS
Status: DISCONTINUED | OUTPATIENT
Start: 2024-10-15 | End: 2024-10-15

## 2024-10-15 RX ADMIN — ONDANSETRON 4 MG: 2 INJECTION INTRAMUSCULAR; INTRAVENOUS at 09:10

## 2024-10-15 RX ADMIN — GLYCOPYRROLATE 0.2 MG: 0.2 INJECTION, SOLUTION INTRAMUSCULAR; INTRAVENOUS at 09:10

## 2024-10-15 RX ADMIN — SODIUM CHLORIDE: 0.9 INJECTION, SOLUTION INTRAVENOUS at 09:10

## 2024-10-15 RX ADMIN — PHENAZOPYRIDINE HYDROCHLORIDE 200 MG: 200 TABLET ORAL at 10:10

## 2024-10-15 RX ADMIN — FENTANYL CITRATE 50 MCG: 50 INJECTION, SOLUTION INTRAMUSCULAR; INTRAVENOUS at 08:10

## 2024-10-15 RX ADMIN — CIPROFLOXACIN 400 MG: 2 INJECTION INTRAVENOUS at 09:10

## 2024-10-15 RX ADMIN — LIDOCAINE HYDROCHLORIDE 100 MG: 20 INJECTION, SOLUTION INTRAVENOUS at 08:10

## 2024-10-15 RX ADMIN — EPHEDRINE SULFATE 10 MG: 50 INJECTION, SOLUTION INTRAMUSCULAR; INTRAVENOUS; SUBCUTANEOUS at 09:10

## 2024-10-15 RX ADMIN — GENTAMICIN SULFATE 80 MG: 80 INJECTION, SOLUTION INTRAVENOUS at 08:10

## 2024-10-15 RX ADMIN — PROPOFOL 150 MG: 10 INJECTION, EMULSION INTRAVENOUS at 08:10

## 2024-10-15 NOTE — ANESTHESIA PREPROCEDURE EVALUATION
10/15/2024  Andry Tyson is a 79 y.o., male.      Pre-op Assessment    I have reviewed the Patient Summary Reports.     I have reviewed the Nursing Notes. I have reviewed the NPO Status.   I have reviewed the Medications.     Review of Systems  Anesthesia Hx:  No problems with previous Anesthesia                Hematology/Oncology:                      Current/Recent Cancer.            Oncology Comments: Prostate     Cardiovascular:     Hypertension           hyperlipidemia                         Hypertension         Pulmonary:  Pulmonary Normal                       Neurological:  Neurology Normal                                      Endocrine:   Hypothyroidism              Physical Exam  General: Well nourished    Airway:  Mallampati: II   Mouth Opening: Normal  TM Distance: Normal  Neck ROM: Normal ROM        Anesthesia Plan  Type of Anesthesia, risks & benefits discussed:    Anesthesia Type: Gen Supraglottic Airway  Intra-op Monitoring Plan: Standard ASA Monitors  Induction:  IV  Informed Consent: Informed consent signed with the Patient and all parties understand the risks and agree with anesthesia plan.  All questions answered.   ASA Score: 2    Ready For Surgery From Anesthesia Perspective.     .

## 2024-10-15 NOTE — TRANSFER OF CARE
"Anesthesia Transfer of Care Note    Patient: Andry Tyson    Procedure(s) Performed: Procedure(s) (LRB):  ULTRASOUND, PROSTATE, RECTAL APPROACH, WITH GOLD FIDUCIAL MARKER INSERTION (N/A)  TRANSPERINEAL INSERTION OF PERIPROSTATIC GEL (N/A)  CYSTOLITHOLAPAXY/CYSTOLITHOTRIPSY (N/A)    Patient location: PACU    Anesthesia Type: general    Transport from OR: Transported from OR on 2-3 L/min O2 by NC with adequate spontaneous ventilation    Post pain: adequate analgesia    Post assessment: no apparent anesthetic complications    Post vital signs: stable    Level of consciousness: awake    Nausea/Vomiting: no nausea/vomiting    Complications: none    Transfer of care protocol was followed      Last vitals: Visit Vitals  BP (!) 125/59 (BP Location: Right arm, Patient Position: Lying)   Pulse 64   Temp 36.5 °C (97.7 °F) (Temporal)   Resp 17   Ht 5' 9" (1.753 m)   Wt 90.7 kg (200 lb)   SpO2 97%   BMI 29.53 kg/m²     "

## 2024-10-15 NOTE — ANESTHESIA POSTPROCEDURE EVALUATION
Anesthesia Post Evaluation    Patient: Andry Tyson    Procedure(s) Performed: Procedure(s) (LRB):  ULTRASOUND, PROSTATE, RECTAL APPROACH, WITH GOLD FIDUCIAL MARKER INSERTION (N/A)  TRANSPERINEAL INSERTION OF PERIPROSTATIC GEL (N/A)  CYSTOLITHOLAPAXY/CYSTOLITHOTRIPSY (N/A)    Final Anesthesia Type: general      Patient location during evaluation: PACU  Patient participation: Yes- Able to Participate  Level of consciousness: awake  Post-procedure vital signs: reviewed and stable  Pain management: adequate  Airway patency: patent    PONV status at discharge: No PONV  Anesthetic complications: no      Cardiovascular status: blood pressure returned to baseline  Respiratory status: unassisted  Hydration status: euvolemic  Follow-up not needed.              Vitals Value Taken Time   /65 10/15/24 1145   Temp 36.5 °C (97.7 °F) 10/15/24 1010   Pulse 64 10/15/24 1145   Resp 16 10/15/24 1145   SpO2 99 % 10/15/24 1145         Event Time   Out of Recovery 11:11:00         Pain/Barrett Score: Barrett Score: 10 (10/15/2024 11:00 AM)  Modified Barrett Score: 19 (10/15/2024 12:45 PM)

## 2024-10-15 NOTE — ANESTHESIA PROCEDURE NOTES
Intubation    Date/Time: 10/15/2024 8:58 AM    Performed by: Tramaine Benton CRNA  Authorized by: Rick Beltran MD    Intubation:     Induction:  Intravenous    Intubated:  Postinduction    Mask Ventilation:  Not attempted    Attempts:  1    Attempted By:  CRNA    Difficult Airway Encountered?: No      Complications:  None    Airway Device:  Supraglottic airway/LMA    Airway Device Size:  4.0    Style/Cuff Inflation:  Cuffed    Secured at:  The lips    Placement Verified By:  Capnometry    Complicating Factors:  None    Findings Post-Intubation:  BS equal bilateral

## 2024-10-16 ENCOUNTER — OFFICE VISIT (OUTPATIENT)
Dept: UROLOGY | Facility: CLINIC | Age: 79
End: 2024-10-16
Payer: OTHER GOVERNMENT

## 2024-10-16 VITALS
BODY MASS INDEX: 29.53 KG/M2 | HEIGHT: 69 IN | SYSTOLIC BLOOD PRESSURE: 128 MMHG | BODY MASS INDEX: 29.62 KG/M2 | WEIGHT: 200 LBS | RESPIRATION RATE: 16 BRPM | HEIGHT: 69 IN | TEMPERATURE: 98 F | OXYGEN SATURATION: 99 % | HEART RATE: 65 BPM | DIASTOLIC BLOOD PRESSURE: 66 MMHG

## 2024-10-16 DIAGNOSIS — C61 PROSTATE CANCER: Primary | ICD-10-CM

## 2024-10-16 PROCEDURE — 99999PBSHW PR PBB SHADOW TECHNICAL ONLY FILED TO HB: Mod: JZ,PBBFAC,,

## 2024-10-16 PROCEDURE — 99999 PR PBB SHADOW E&M-EST. PATIENT-LVL II: CPT | Mod: PBBFAC,,, | Performed by: UROLOGY

## 2024-10-16 PROCEDURE — 96372 THER/PROPH/DIAG INJ SC/IM: CPT | Mod: ,,, | Performed by: UROLOGY

## 2024-10-16 PROCEDURE — 96402 CHEMO HORMON ANTINEOPL SQ/IM: CPT | Mod: ,,, | Performed by: UROLOGY

## 2024-10-16 PROCEDURE — 99499 UNLISTED E&M SERVICE: CPT | Mod: S$PBB,,, | Performed by: UROLOGY

## 2024-10-16 PROCEDURE — 99212 OFFICE O/P EST SF 10 MIN: CPT | Mod: PBBFAC,PO | Performed by: UROLOGY

## 2024-10-16 RX ADMIN — LEUPROLIDE ACETATE 45 MG: KIT at 09:10

## 2024-10-16 NOTE — PROGRESS NOTES
Date: 10/16/24     Lupron 45 mg administered today.     Keep scheduled follow up with rad onc and RTC in 4 months with PSA prior.     
Recvd pt asleep but responsive to all stimuli.  no sob or respiratory distress noted at this time.  ivf's with mvi infusing; no s/s of infiltration at insertion site of hl.  pt is calm and resting comfortably with 1:1 observation at bedside.  will continue to monitor.

## 2024-10-23 LAB
COMPN STONE: NORMAL
LABORATORY COMMENT REPORT: NORMAL
SPECIMEN SOURCE: NORMAL
STONE ANALYSIS IR-IMP: NORMAL

## 2024-10-30 LAB
OHS QRS DURATION: 102 MS
OHS QTC CALCULATION: 437 MS

## 2024-11-05 ENCOUNTER — PATIENT MESSAGE (OUTPATIENT)
Dept: RADIATION ONCOLOGY | Facility: CLINIC | Age: 79
End: 2024-11-05

## 2024-11-05 DIAGNOSIS — C61 PROSTATE CANCER: Primary | ICD-10-CM

## 2024-11-05 RX ORDER — TAMSULOSIN HYDROCHLORIDE 0.4 MG/1
0.4 CAPSULE ORAL 2 TIMES DAILY
Qty: 60 CAPSULE | Refills: 5 | Status: SHIPPED | OUTPATIENT
Start: 2024-11-05

## 2024-11-05 RX ORDER — TAMSULOSIN HYDROCHLORIDE 0.4 MG/1
0.4 CAPSULE ORAL 2 TIMES DAILY
Status: CANCELLED | OUTPATIENT
Start: 2024-11-05

## 2024-12-03 ENCOUNTER — TREATMENT (OUTPATIENT)
Dept: RADIATION ONCOLOGY | Facility: CLINIC | Age: 79
End: 2024-12-03
Payer: OTHER GOVERNMENT

## 2024-12-03 PROCEDURE — G6015 RADIATION TX DELIVERY IMRT: HCPCS | Mod: S$GLB,,, | Performed by: RADIOLOGY

## 2024-12-03 PROCEDURE — 77014 PR  CT GUIDANCE PLACEMENT RAD THERAPY FIELDS: CPT | Mod: S$GLB,,, | Performed by: RADIOLOGY

## 2024-12-31 ENCOUNTER — TREATMENT (OUTPATIENT)
Dept: RADIATION ONCOLOGY | Facility: CLINIC | Age: 79
End: 2024-12-31
Payer: OTHER GOVERNMENT

## 2024-12-31 PROCEDURE — 77014 PR  CT GUIDANCE PLACEMENT RAD THERAPY FIELDS: CPT | Mod: S$GLB,,, | Performed by: RADIOLOGY

## 2024-12-31 PROCEDURE — G6015 RADIATION TX DELIVERY IMRT: HCPCS | Mod: S$GLB,,, | Performed by: RADIOLOGY

## 2025-01-21 ENCOUNTER — CLINICAL SUPPORT (OUTPATIENT)
Dept: RADIATION ONCOLOGY | Facility: CLINIC | Age: 80
End: 2025-01-21
Payer: OTHER GOVERNMENT

## 2025-01-21 DIAGNOSIS — C61 PROSTATE CANCER: Primary | ICD-10-CM

## 2025-01-21 PROCEDURE — 99499 UNLISTED E&M SERVICE: CPT | Mod: 93,,, | Performed by: RADIOLOGY

## 2025-01-22 PROBLEM — C61 PROSTATE CANCER: Status: ACTIVE | Noted: 2025-01-22

## 2025-01-22 NOTE — PROGRESS NOTES
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  Pt reports persistent freq and nocturia w/o pain, bleeding, burning, retention, incontinence, or other concerns.    A/P  1.  PSA now and and q6m  2.  Follow-up with Urology; continue ADT x2-3 years  3.  Return to clinic in 6m; [facility]

## 2025-01-23 DIAGNOSIS — C61 PROSTATE CANCER: Primary | ICD-10-CM

## 2025-02-18 ENCOUNTER — LAB VISIT (OUTPATIENT)
Dept: LAB | Facility: HOSPITAL | Age: 80
End: 2025-02-18
Attending: UROLOGY
Payer: MEDICARE

## 2025-02-18 DIAGNOSIS — C61 PROSTATE CANCER: ICD-10-CM

## 2025-02-18 LAB — COMPLEXED PSA SERPL-MCNC: <0.01 NG/ML (ref 0–4)

## 2025-02-18 PROCEDURE — 36415 COLL VENOUS BLD VENIPUNCTURE: CPT | Performed by: UROLOGY

## 2025-02-18 PROCEDURE — 84153 ASSAY OF PSA TOTAL: CPT | Performed by: UROLOGY

## 2025-02-20 ENCOUNTER — OFFICE VISIT (OUTPATIENT)
Dept: UROLOGY | Facility: CLINIC | Age: 80
End: 2025-02-20
Payer: OTHER GOVERNMENT

## 2025-02-20 VITALS — BODY MASS INDEX: 29.62 KG/M2 | WEIGHT: 200 LBS | HEIGHT: 69 IN

## 2025-02-20 DIAGNOSIS — C61 PROSTATE CANCER: Primary | ICD-10-CM

## 2025-02-20 DIAGNOSIS — N21.0 BLADDER STONE: ICD-10-CM

## 2025-02-20 PROCEDURE — 99213 OFFICE O/P EST LOW 20 MIN: CPT | Mod: PBBFAC,PO | Performed by: UROLOGY

## 2025-02-20 NOTE — PROGRESS NOTES
[facility] Established Patient/H&P:    Andyr Tyson is a 79 y.o. male who presents for follow up for bladder stone and high-risk prostate cancer.     Patient with a history of lower urinary tract symptoms who was referred for a bladder stone incidentally found for work up for bilateral inguinal hernias in 2022. Was never referred to urology.      He has since [facility] with mild interval increase in the size of a known bladder calculus measuring 0.73 cm with no bladder wall thickening.      He has mild lower urinary tract symptoms - frequency, urgency and nocturia x 1. Patient has been on Flomax 0.4 mg and Finasteride 5 mg PO daily for several years. Moderate ED - not sexually active.      Urine dipstick with moderate blood.      Of note, his PSA is 2.8 (5.6) when adjusted for Finasteride. Has never undergone any evaluation. States his PSA has been elevated for years. No biopsy every performed.      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        Interval History     8/22/24: MRI prostate with two PI-RADs 4 lesions int he right and left apex on 7/23/24. He is now s/p cystoscopy and MRI fusion biopsy on 8/1/24.  Cysto revealed a 7 mm bladder stone with mild bladder trabeculations. Prostate biopsy with [place] 4+4=8 prostate cancer in 2 cores, [place] 4+3=7 in 5 cores and [place] 3+4=7 in 1 core. Here today for follow up.      10/3/24: Virtual audio visit today for follow up. He has undergone evaluation with Dr. Love to discuss RALP and rad onc. He states he is interested in proceeding with radiation therapy. Needs Lupron, spaceOAR and markers. Remains with a bladder stone.     2/20/25: Here today for follow up. Underwent cystolitholapaxy, Barrigel placement and marker placement on 10/15/24. Lupron administered on 10/16/24. He completed XRT on 12/31/24. PSA undetectable.       Denies any fever, chills, gross hematuria, flank pain,  "bone pain, unintentional weight loss or  trauma.         IPSS    QoL  4          2          7/12/24     PVR  0 mL                8/1/24     PSA  <0.01  2/18/25  2.8*                  4/9/24     A1C  5.3                   4/9/24    ADT History  Lupron 45 10/16/24     *On Finasteride and outside records    Past Medical History:   Diagnosis Date    Elevated PSA     HTN (hypertension)     Hypercholesterolemia     Prostate cancer     Thyroid disease        Past Surgical History:   Procedure Laterality Date    CYSTOSCOPIC LITHOLAPAXY N/A 10/15/2024    Procedure: CYSTOLITHOLAPAXY/CYSTOLITHOTRIPSY;  Surgeon: Benton Ventura Jr., MD;  Location: [facility] OR;  Service: Urology;  Laterality: N/A;    CYSTOSCOPY N/A 7/31/2024    Procedure: CYSTOSCOPY;  Surgeon: Benton Ventura Jr., MD;  Location: [facility] OR;  Service: Urology;  Laterality: N/A;    HERNIA REPAIR      TRANSPERINEAL INSERTION OF PERIPROSTATIC GEL N/A 10/15/2024    Procedure: TRANSPERINEAL INSERTION OF PERIPROSTATIC GEL;  Surgeon: Benton Ventura Jr., MD;  Location: [facility] OR;  Service: Urology;  Laterality: N/A;    TRANSRECTAL BIOPSY OF PROSTATE WITH ULTRASOUND GUIDANCE N/A 7/31/2024    Procedure: Uronav  BIOPSY, PROSTATE, RECTAL APPROACH, WITH US GUIDANCE;  Surgeon: Benton Ventura Jr., MD;  Location: [facility] OR;  Service: Urology;  Laterality: N/A;    TRANSRECTAL ULTRASOUND OF PROSTATE WITH INSERTION OF GOLD FIDUCIAL MARKER N/A 10/15/2024    Procedure: ULTRASOUND, PROSTATE, RECTAL APPROACH, WITH GOLD FIDUCIAL MARKER INSERTION;  Surgeon: Benton Ventura Jr., MD;  Location: [facility];  Service: Urology;  Laterality: N/A;       Review of patient's allergies indicates:   Allergen Reactions    Penicillin Hives       Medications Reviewed: see MAR    FOCUSED PHYSICAL EXAM:    There were no vitals filed for this visit.  Body mass index is 29.53 kg/m². Weight: 90.7 kg (200 lb) Height: 5' 9" (175.3 cm)       General: Alert, cooperative, no distress, appears stated age  Abdomen: " Soft, non-tender, no CVA tenderness, non-distended      LABS:    Recent Results (from the past 2 weeks)   Prostate Specific Antigen, Diagnostic    Collection Time: 02/18/25  9:31 AM   Result Value Ref Range    PSA Diagnostic <0.01 0.00 - 4.00 ng/mL           Assessment/Diagnosis:    1. Prostate cancer  Prostate Specific Antigen, Diagnostic      2. Bladder stone            Plans:    - I spent 30 minutes of the day of this encounter preparing for, treating and managing this patient. Visit today included increased complexity associated with the care of the episodic problem addressed and managing the longitudinal care of the patient due to the serious and/or complex managed problem(s) prostate cancer. Reviewed the NCCN guidelines with patient regarding surveillance after XRT which he completed on 12/31/24. We discussed that he will require PSA every 6 months for approximately 5 years. PSA currently <0.01.    - Due for Lupron on or around 4/1/25 for Lupron 45 mg with an NP.   - RTC with me in 10/2025 with PSA and Lupron.

## 2025-04-28 ENCOUNTER — PATIENT MESSAGE (OUTPATIENT)
Dept: UROLOGY | Facility: CLINIC | Age: 80
End: 2025-04-28
Payer: OTHER GOVERNMENT

## 2025-05-02 NOTE — TELEPHONE ENCOUNTER
Spoke with patient informed him I have not heard anything from the [facility] on his ref from them for him to get his Lupron. Patient states we can try and use his [employer] insurance to obtain authorization. Informed him I would send a message to the auth department. Informed him Mondays appointment may have top be pushed back. Patient verbalized understanding.

## 2025-05-02 NOTE — TELEPHONE ENCOUNTER
Below is message sent to the auth department.     This patient is scheduled to receive Lupron on Monday. I have contacted the [facility] multiple times to request an upgraded referral for urology, but I have not received a response, nor has an updated referral been sent. The patient states that he has [employer] insurance. Can we try processing the Lupron through [employer] in order to obtain approval?

## 2025-05-05 ENCOUNTER — TELEPHONE (OUTPATIENT)
Dept: UROLOGY | Facility: CLINIC | Age: 80
End: 2025-05-05
Payer: OTHER GOVERNMENT

## 2025-05-05 NOTE — TELEPHONE ENCOUNTER
----- Message from Aranza sent at 5/5/2025  2:05 PM CDT -----  Type:  Needs Medical AdviceWho Called: ptWould the patient rather a call back or a response via [facility]? Please callBe Call Back Number: [phone] Information: cancelled ap[pt for 05/29 [facility]   Please call back to advise. Thanks!

## 2025-05-06 NOTE — TELEPHONE ENCOUNTER
Spoke with and contacted the patient to inform him that Lupron has been authorized. The patient reported that he had [facility] yesterday and was able to be seen today. He received Eligard during that visit. The patient stated he will request that the [facility] fax the visit and treatment records to our clinic for documentation.  Patient is unsure at this time if he is going to continue care with  or [facility].

## 2025-07-06 ENCOUNTER — PATIENT MESSAGE (OUTPATIENT)
Dept: RADIATION ONCOLOGY | Facility: CLINIC | Age: 80
End: 2025-07-06

## 2025-07-24 ENCOUNTER — CLINICAL SUPPORT (OUTPATIENT)
Dept: RADIATION ONCOLOGY | Facility: CLINIC | Age: 80
End: 2025-07-24
Payer: OTHER GOVERNMENT

## 2025-07-24 DIAGNOSIS — C61 PROSTATE CANCER: Primary | ICD-10-CM

## 2025-07-24 NOTE — PROGRESS NOTES
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    The patient location is: home  Visit type: Virtual visit with audio ONLY  The reason for the audio only service rather than synchronous audio and video virtual visit was related to technical difficulties or patient preference/necessity.    HISTORY OF PRESENT ILLNESS:   Mr. Tyson is a 79M who p/w a rising PSA and now the following w/u & onc hx:     PSA is 2.8 (5.6) when adjusted for Finasteride      8/22/24: MRI prostate with two PI-RADs 4 lesions in the right and left apex on 7/23/24. 50 g     Prostate biopsy 8/1/24: [place] 4+4=8 prostate cancer in 2 cores, [place] 4+3=7 in 5 cores and [place] 3+4=7 in 1 core.      PSMA 9/3/24: Focal increased radiotracer uptake in the prostate apex in keeping with known prostate cancer. No evidence of metastatic disease.     He then completed definitive IMRT on 12/31/25 w/ LT-ADT.    INTERVAL HISTORY:   Pt tolerated RT well and PSA remain undetectable as of last draw here in Feb 2025, and pt reports [facility] test of last week also being undetectable.    Review of systems otherwise negative unless indicated in HPI/interval history.    Past Medical History:   Diagnosis Date    Elevated PSA     HTN (hypertension)     Hypercholesterolemia     Prostate cancer     Thyroid disease      Past Surgical History:   Procedure Laterality Date    CYSTOSCOPIC LITHOLAPAXY N/A 10/15/2024    Procedure: CYSTOLITHOLAPAXY/CYSTOLITHOTRIPSY;  Surgeon: Benton Ventura Jr., MD;  Location: [facility] OR;  Service: Urology;  Laterality: N/A;    CYSTOSCOPY N/A 7/31/2024    Procedure: CYSTOSCOPY;  Surgeon: Benton Ventura Jr., MD;  Location: [facility] OR;  Service: Urology;  Laterality: N/A;    HERNIA REPAIR      TRANSPERINEAL INSERTION OF PERIPROSTATIC GEL N/A 10/15/2024    Procedure: TRANSPERINEAL INSERTION OF PERIPROSTATIC GEL;   Surgeon: Benton Ventura Jr., MD;  Location: [facility] OR;  Service: Urology;  Laterality: N/A;    TRANSRECTAL BIOPSY OF PROSTATE WITH ULTRASOUND GUIDANCE N/A 7/31/2024    Procedure: Uronav  BIOPSY, PROSTATE, RECTAL APPROACH, WITH US GUIDANCE;  Surgeon: Benton Ventura Jr., MD;  [facility];  Service: Urology;  Laterality: N/A;    TRANSRECTAL ULTRASOUND OF PROSTATE WITH INSERTION OF GOLD FIDUCIAL MARKER N/A 10/15/2024    Procedure: ULTRASOUND, PROSTATE, RECTAL APPROACH, WITH GOLD FIDUCIAL MARKER INSERTION;  Surgeon: Benton Ventura Jr., MD;  Location: [facility] OR;  Service: Urology;  Laterality: N/A;     Social History     Socioeconomic History    Marital status: Single   Tobacco Use    Smoking status: Never    Smokeless tobacco: Never   Substance and Sexual Activity    Alcohol use: Yes     Alcohol/week: 3.0 standard drinks of alcohol     Types: 3 Drinks containing 0.5 oz of alcohol per week    Drug use: Never     Social Drivers of Health     Financial Resource Strain: Low Risk  (2/13/2025)    Overall Financial Resource Strain (CARDIA)     Difficulty of Paying Living Expenses: Not very hard   Food Insecurity: No Food Insecurity (2/13/2025)    Hunger Vital Sign     Worried About Running Out of Food in the Last Year: Never true     Ran Out of Food in the Last Year: Never true   Transportation Needs: No Transportation Needs (2/13/2025)    PRAPARE - Transportation     Lack of Transportation (Medical): No     Lack of Transportation (Non-Medical): No   Physical Activity: Insufficiently Active (2/13/2025)    Exercise Vital Sign     Days of Exercise per Week: 2 days     Minutes of Exercise per Session: 30 min   Stress: Stress Concern Present (2/13/2025)    [employer] - Occupational Stress Questionnaire     Feeling of Stress : To some extent   Housing Stability: Low Risk  (2/13/2025)    Housing Stability Vital Sign     Unable to Pay for Housing in the Last Year: No     Number of Times Moved in  the Last Year: 0     Homeless in the Last Year: No     Family History   Problem Relation Name Age of Onset    Skin cancer Father       Medication List with Changes/Refills   Current Medications    ASCORBIC ACID, VITAMIN C, (VITAMIN C) 500 MG TABLET    Take 500 mg by mouth once daily.    ASPIRIN (ECOTRIN) 81 MG EC TABLET    Take 81 mg by mouth once daily.    FINASTERIDE (PROSCAR) 5 MG TABLET    Take 5 mg by mouth.    HYDROCHLOROTHIAZIDE (HYDRODIURIL) 25 MG TABLET    Take 12.5 mg by mouth.    LEVOTHYROXINE (SYNTHROID) 50 MCG TABLET    Take 0.05 mg by mouth.    POTASSIUM CHLORIDE SA (K-DUR,KLOR-CON) 20 MEQ TABLET    Take 20 mEq by mouth.    ROSUVASTATIN (CRESTOR) 20 MG TABLET    Take 20 mg by mouth once daily.    TAMSULOSIN (FLOMAX) 0.4 MG CAP    Take 1 capsule (0.4 mg total) by mouth 2 (two) times a day.    VERAPAMIL (CALAN-SR) 240 MG CR TABLET    Take 240 mg by mouth.     Review of patient's allergies indicates:   Allergen Reactions    Penicillin Hives       PHYSICAL EXAM:   - AAOx3  - speaking clearly and comfortably w/o resp difficulties  - no evidence of pain detected via audio conversation      ASSESSMENT: 79 y.o. 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 Patient understands free to call us anytime with any questions or concerns regarding radiation therapy.    I have personally evaluated this patient with a moderate to high complexity diagnosis.     Total time spent on day of the encounter: 10 minutes dedicated to reviewing/interpreting pertinent laboratory/imaging/pathology as well as follow-up with concurrent consultants; reviewing and performing history; counseling patient on continuing oncologic  recommendations; documentation in the electronic medical record including ordering of additional tests and/or radiation treatment protocol; and coordination of care with physicians with referrals placed as appropriate.    15 minutes spent in medical discussion with patient.    Each patient to whom he or she provides medical services by telemedicine is:  (1) informed of the relationship between the physician and patient and the respective role of any other health care provider with respect to management of the patient; and (2) notified that he or she may decline to receive medical services by telemedicine and may withdraw from such care at any time. Patient verbally consented to receive this service via voice-only telephone call.    This service was not originating from a related E/M service provided within the previous 7 days nor will  to an E/M service or procedure within the next 24 hours or my soonest available appointment.  Prevailing standard of care was able to be met in this audio-only visit.      PHYSICIAN: Jonas Rainey III, MD

## (undated) DEVICE — JELLY KY LUBRICATING 5G PACKET

## (undated) DEVICE — APPLICATOR CHLORAPREP ORN 26ML

## (undated) DEVICE — JELLY SURGILUBE LUBE TUBE 2OZ

## (undated) DEVICE — CONTAINER SPECIMEN OR STER 4OZ

## (undated) DEVICE — SYR SALINE  FLUSH PREFIL 10ML

## (undated) DEVICE — COVER PROXIMA MAYO STAND

## (undated) DEVICE — GUN BIOPSY 18GA MONOPLY

## (undated) DEVICE — GUIDE WIRE MOTION .035 X 150CM

## (undated) DEVICE — STRAP OR TABLE 5IN X 72IN

## (undated) DEVICE — SYS LABEL CORRECT MED

## (undated) DEVICE — CATH POLLACK OPEN-END FLEXI-TI

## (undated) DEVICE — DRAPE UINDERBUT GRAD PCH

## (undated) DEVICE — NDL SPINAL 22GX7 SPINOCAN

## (undated) DEVICE — SYR 10CC LUER LOCK

## (undated) DEVICE — GUIDE BIOPSY BIPLANAR 18G

## (undated) DEVICE — GOWN POLY REINF BRTH SLV XL

## (undated) DEVICE — TOWEL OR DISP STRL BLUE 4/PK

## (undated) DEVICE — COVER TRANSDUCER LATEX N/STERI

## (undated) DEVICE — SPONGE BULKEE II ABSRB 6X6.75

## (undated) DEVICE — SLEEVE SCD EXPRESS KNEE MEDIUM